# Patient Record
Sex: MALE | Race: BLACK OR AFRICAN AMERICAN
[De-identification: names, ages, dates, MRNs, and addresses within clinical notes are randomized per-mention and may not be internally consistent; named-entity substitution may affect disease eponyms.]

---

## 2018-02-12 ENCOUNTER — HOSPITAL ENCOUNTER (EMERGENCY)
Dept: HOSPITAL 17 - NEPK | Age: 43
Discharge: HOME | End: 2018-02-12
Payer: SELF-PAY

## 2018-02-12 VITALS
TEMPERATURE: 98.8 F | SYSTOLIC BLOOD PRESSURE: 132 MMHG | RESPIRATION RATE: 14 BRPM | DIASTOLIC BLOOD PRESSURE: 70 MMHG | HEART RATE: 89 BPM | OXYGEN SATURATION: 98 %

## 2018-02-12 DIAGNOSIS — N34.2: Primary | ICD-10-CM

## 2018-02-12 PROCEDURE — 99283 EMERGENCY DEPT VISIT LOW MDM: CPT

## 2018-02-12 PROCEDURE — 87591 N.GONORRHOEAE DNA AMP PROB: CPT

## 2018-02-12 PROCEDURE — 87491 CHLMYD TRACH DNA AMP PROBE: CPT

## 2018-02-12 PROCEDURE — 96372 THER/PROPH/DIAG INJ SC/IM: CPT

## 2018-02-12 NOTE — PD
HPI


Chief Complaint:   Complaint


Time Seen by Provider:  15:26


Travel History


International Travel<30 days:  No


Contact w/Intl Traveler<30days:  No


Traveled to known affect area:  No





History of Present Illness


HPI


42-year-old male presents for evaluation of dysuria.  Symptoms started 1 week 

ago.  He reports a burning sensation which is aggravated by urination with no 

relieving factors.  He reports white urethral discharge.  He reports that his 

girlfriend was recently diagnosed with gonorrhea and treated for the same.  

Denies any abdominal pain, testicular or scrotal pain, fevers or chills.  He 

has no other complaints at this time.





[facility]


Social History


Alcohol Use:  Yes


Tobacco Use:  No





Allergies-Medications


(Allergen,Severity, Reaction):  


Coded Allergies:  


     No Known Allergies (Verified  Allergy, Unknown, 2/12/18)


Reported Meds & Prescriptions





Reported Meds & Active Scripts


Active


No Active Prescriptions or Reported Medications    








Review of Systems


General / Constitutional:  No: Fever, Chills


Gastrointestinal:  No: Nausea, Vomiting, Abdominal Pain


Genitourinary:  Positive: Dysuria, Other (positive for urethral discharge)





Physical Exam


Narrative


GENERAL: Well-nourished male in no acute distress


SKIN: Warm and dry.


CARDIOVASCULAR: Regular rate and rhythm.  No murmur appreciated.


RESPIRATORY: No accessory muscle use. Clear to auscultation. Breath sounds 

equal bilaterally. 


GASTROINTESTINAL: Abdomen soft, non-tender, nondistended. Hepatic and splenic 

margins not palpable. 


 examination reveals white urethral discharge.  Circumcised.  No scrotal 

cutaneous lesions.





Data


Data


Last Documented VS





Vital Signs








  Date Time  Temp Pulse Resp B/P (MAP) Pulse Ox O2 Delivery O2 Flow Rate FiO2


 


2/12/18 15:07 98.8 89 14 132/70 (90) 98   








Orders





 Orders


Gc And Chlamydia Pcr (2/12/18 15:24)


Azithromycin (Zithromax) (2/12/18 15:45)


Ceftriaxone Inj (Rocephin Inj) (2/12/18 15:45)


Lidocaine 1% Inj (50 Ml) (Xylocaine 1% I (2/12/18 15:45)


Ed Discharge Order (2/12/18 15:41)








MDM


Medical Decision Making


Medical Screen Exam Complete:  Yes


Emergency Medical Condition:  Yes


Medical Record Reviewed:  Yes


Differential Diagnosis


Urethritis-gonococcal versus nongonococcal versus cystitis


Narrative Course


History examination are consistent with urethritis.  The patient will be 

treated empirically with Rocephin and azithromycin pending GC PCR results.





Diagnosis





 Primary Impression:  


 Urethritis


Referrals:  


[facility] Dept.





***Additional Instructions:  


Follow-up at the health department or with primary care physician for routine 

STD testing.  No sex or contact until 1 week after you and partner have been 

tested and treated.  Return for any emergent medical conditions.


***Med/Other Pt SpecificInfo:  No Change to Meds


Scripts


No Active Prescriptions or Reported Meds


Disposition:  01 DISCHARGE HOME


Condition:  Stable











Bryant Renee Feb 12, 2018 15:45